# Patient Record
Sex: MALE | Race: BLACK OR AFRICAN AMERICAN | NOT HISPANIC OR LATINO | Employment: UNEMPLOYED | URBAN - METROPOLITAN AREA
[De-identification: names, ages, dates, MRNs, and addresses within clinical notes are randomized per-mention and may not be internally consistent; named-entity substitution may affect disease eponyms.]

---

## 2017-01-16 DIAGNOSIS — N52.9 IMPOTENCE OF ORGANIC ORIGIN: Primary | ICD-10-CM

## 2017-01-16 RX ORDER — TADALAFIL 20 MG/1
10-20 TABLET ORAL DAILY PRN
Qty: 12 TABLET | Refills: 0 | Status: SHIPPED
Start: 2017-01-16 | End: 2020-06-30

## 2017-01-16 NOTE — TELEPHONE ENCOUNTER
tadalafil (CIALIS) 20 MG tablet      Last Written Prescription Date: 10-29-15  Last Fill Quantity: 12,  # refills: 11   Last Office Visit with [facility], [facility] or [facility] prescribing provider: 11-16-15

## 2017-01-16 NOTE — TELEPHONE ENCOUNTER
Routing refill request to provider for review/approval because:  Patient needs to be seen because it has been more than 1 year since last office visit.  Lay Tomlin RN [facility] Triage.

## 2019-03-16 ENCOUNTER — APPOINTMENT (OUTPATIENT)
Dept: GENERAL RADIOLOGY | Facility: CLINIC | Age: 49
End: 2019-03-16
Attending: PHYSICIAN ASSISTANT

## 2019-03-16 ENCOUNTER — HOSPITAL ENCOUNTER (EMERGENCY)
Facility: CLINIC | Age: 49
Discharge: HOME | End: 2019-03-16
Admitting: PHYSICIAN ASSISTANT

## 2019-03-16 VITALS
OXYGEN SATURATION: 96 % | DIASTOLIC BLOOD PRESSURE: 91 MMHG | SYSTOLIC BLOOD PRESSURE: 134 MMHG | HEIGHT: 71 IN | BODY MASS INDEX: 29.4 KG/M2 | TEMPERATURE: 100 F | HEART RATE: 99 BPM | WEIGHT: 210 LBS | RESPIRATION RATE: 16 BRPM

## 2019-03-16 DIAGNOSIS — M54.50 BILATERAL LOW BACK PAIN WITHOUT SCIATICA: ICD-10-CM
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
## 2019-03-16 PROCEDURE — 99285 EMERGENCY DEPT VISIT HI MDM: CPT

## 2019-03-16 PROCEDURE — 73090 X-RAY EXAM OF FOREARM: CPT | Mod: LT

## 2019-03-16 PROCEDURE — 73110 X-RAY EXAM OF WRIST: CPT | Mod: LT

## 2019-03-16 PROCEDURE — 25000132 ZZH RX MED GY IP 250 OP 250 PS 637: Performed by: PHYSICIAN ASSISTANT

## 2019-03-16 PROCEDURE — 72100 X-RAY EXAM L-S SPINE 2/3 VWS: CPT

## 2019-03-16 RX ORDER — IBUPROFEN 600 MG/1
600 TABLET, FILM COATED ORAL ONCE
Status: COMPLETED | OUTPATIENT
Start: 2019-03-16 | End: 2019-03-16

## 2019-03-16 RX ORDER — METHOCARBAMOL 750 MG/1
750 TABLET, FILM COATED ORAL ONCE
Status: COMPLETED | OUTPATIENT
Start: 2019-03-16 | End: 2019-03-16

## 2019-03-16 RX ORDER — ACETAMINOPHEN 500 MG
1000 TABLET ORAL ONCE
Status: COMPLETED | OUTPATIENT
Start: 2019-03-16 | End: 2019-03-16

## 2019-03-16 RX ORDER — METHOCARBAMOL 750 MG/1
750-1500 TABLET, FILM COATED ORAL 3 TIMES DAILY PRN
Qty: 30 TABLET | Refills: 0 | Status: SHIPPED | OUTPATIENT
Start: 2019-03-16 | End: 2019-03-21

## 2019-03-16 RX ADMIN — ACETAMINOPHEN 1000 MG: 500 TABLET, FILM COATED ORAL at 20:43

## 2019-03-16 RX ADMIN — IBUPROFEN 600 MG: 600 TABLET ORAL at 20:43

## 2019-03-16 RX ADMIN — METHOCARBAMOL TABLETS 750 MG: 750 TABLET, COATED ORAL at 20:43

## 2019-03-16 ASSESSMENT — ENCOUNTER SYMPTOMS
NECK PAIN: 0
HEADACHES: 1
BACK PAIN: 1
ARTHRALGIAS: 1
WEAKNESS: 1

## 2019-03-16 ASSESSMENT — MIFFLIN-ST. JEOR: SCORE: 1834.68

## 2019-03-16 NOTE — ED AVS SNAPSHOT
Emergency Department  [address] [place] [address]  Phone:  [phone]  Fax:  [phone]                                    Abdulaziz Reinoso   MRN: [id number]    Department:   Emergency Department   Date of Visit:  3/16/2019           After Visit Summary Signature Page    I have received my discharge instructions, and my questions have been answered. I have discussed any challenges I see with this plan with the nurse or doctor.    ..........................................................................................................................................  Patient/Patient Representative Signature      ..........................................................................................................................................  Patient Representative Print Name and Relationship to Patient    ..................................................               ................................................  Date                                   Time    ..........................................................................................................................................  Reviewed by Signature/Title    ...................................................              ..............................................  Date                                               Time          22EPIC Rev 08/18

## 2019-03-17 NOTE — ED PROVIDER NOTES
History     Chief Complaint:  Fall      HPI   Abdulaziz Reinoso is a 48 year old male with a history of schizoaffective disorder, bipolar II disorder, substance abuse, and chronic bilateral low back pain who presents to the ED for evaluation after a mechanical fall. The patient reports that he slipped and fell on the ice today, falling sideways and landing on his outstretched left hand and back. He states that he did hit the back of his head on the ice, although he did not lose consciousness or vomit and is not on blood thinners. The patient is experiencing some mid lower back pain and left wrist pain secondary to the fall, and thus he presented to the ED for evaluation. Here, the patient additionally endorses some left forearm pain and left wrist pain. He also reports a mild headache, although he has no neck pain or visual disturbance. He denies any other injuries from the fall. Of note, the patient endorses having low back pain at baseline, for which he takes 800 mg of gabapentin three times per day. He notes that his PCP recently left town and he ran out of his prescription, so he has not been taking this for the past week.     Allergies:  NKDA     Medications:    Omeprazole  Pravastatin  Seroquel  Sertraline  Viagra  Tadalafil  Trazodone  Gabapentin    Past Medical History:    ADHD  Bipolar II disorder  GERD  Schizoaffective disorder  Chronic low back pain  Insomnia  Cocaine dependence    Past Surgical History:    The patient does not have any pertinent past surgical history.    Family History:    Diabetes  Hypertension  Pancreatic cancer  Cerebrovascular disease  Skin cancer    Social History:  Smoking Status: Current every day smoker  Negative for alcohol use.  History of cocaine dependence.  Marital Status:  Single    Review of Systems   Eyes: Negative for visual disturbance.   Musculoskeletal: Positive for arthralgias (L wrist) and back pain. Negative for neck pain.   Neurological: Positive for weakness (L  "hand) and headaches.   All other systems reviewed and are negative.      Physical Exam     Patient Vitals for the past 24 hrs:   BP Temp Temp src Pulse Resp SpO2 Height Weight   03/16/19 2004 (!) 134/91 100  F (37.8  C) Oral 99 16 96 % -- --   03/16/19 1947 -- -- -- -- -- -- 1.803 m (5' 11\") 95.3 kg (210 lb)        Physical Exam  General: Alert and cooperative with exam. Resting comfortably on gurney  Head:  Scalp is NC/AT  Eyes:  No scleral icterus, PERRL  ENT:  The external nose and ears are normal.   Neck:  Normal range of motion without rigidity.  CV:  Regular rate and rhythm    No pathologic murmur, rubs, or gallops.    2+ radial pulses bilaterally with normal cap refill.  Resp:  Breath sounds are clear bilaterally.  No crackles, wheezes, rhonchi.    Non-labored, no retractions or accessory muscle use  GI:  Abdomen is soft, no distension, no tenderness, no masses. No peritoneal signs.  Bowel sounds present in all quadrants  :  No suprapubic or flank tenderness  MS:  No lower extremity edema or asymmetric calf swelling.    No midline cervical, thoracic, or lumbar tenderness    Diffuse tenderness to palpation over the distal forearm and wrist. No tenderness over the elbow with normal ROM in the wrist and elbow. No anatomic snuffbox tenderness or pain with axial loading of the thumb.   Skin:  Warm and dry, No rash or lesions noted.  Neuro: Oriented. No gross motor deficits.    Strength and sensation grossly intact in all 4 extremities and in radial, ulnar, median nerve distributions of hand.  Cranial nerves 2-12 intact. GCS: 15. Normal finger to nose and heel to shin testing.  Gait normal  Psych: Awake. Alert. Normal affect. Appropriate interactions.     Emergency Department Course     Imaging:  Radiographic findings were communicated with the patient who voiced understanding of the findings.    XR Wrist 3 views, Left:   1. Chronic scaphoid waist fracture with nonunion but without proximal  pole avascular " necrosis.  2. Small well-corticated ossification along the dorsal aspect of wrist  likely represents a triquetral fracture fragment and appears chronic.  3. Soft tissue swelling along the dorsal aspect of wrist is noted.  4. No acute fracture or malalignment of the wrist or forearm is seen.  5. Olecranon spur at the triceps tendon insertion is noted and has  lucency at the base. This could represent a fracture of the spur. This  is of indeterminate age, as per radiology.      XR Left Radius/Ulna PA & LAT:   1. Chronic scaphoid waist fracture with nonunion but without proximal  pole avascular necrosis.  2. Small well-corticated ossification along the dorsal aspect of wrist  likely represents a triquetral fracture fragment and appears chronic.  3. Soft tissue swelling along the dorsal aspect of wrist is noted.  4. No acute fracture or malalignment of the wrist or forearm is seen.  5. Olecranon spur at the triceps tendon insertion is noted and has  lucency at the base. This could represent a fracture of the spur. This  is of indeterminate age, as per radiology.     XR Lumbar spine, 2-3 Views:   There is some upper lumbar kyphosis. There is also some  minimal retrolisthesis at L2-L3. Posterior alignment is otherwise  normal. Minimal degenerative changes are noted. No evidence for  fracture, as per radiology.      Interventions:  2043 Tylenol 1000 mg PO   Ibuprofen 600 mg PO   Robaxin 750 mg PO    Emergency Department Course:  Nursing notes and vitals reviewed. (2009) I performed an exam of the patient as documented above.     Medicine administered as documented above.     The patient was sent for left wrist, left radius/ulna, and lumbar spine x-ray's while in the emergency department, findings above.     (2153) I rechecked the patient and discussed the results of his workup thus far.     Findings and plan explained to the Patient. Patient discharged home with instructions regarding supportive care, medications, and  "reasons to return. The importance of close follow-up was reviewed. The patient was prescribed Robaxin.    I personally reviewed the imaging results with the Patient and answered all related questions prior to discharge.      Impression & Plan      Medical Decision Making:  Abdulaziz Reinoso is a 48 year old male who presents for evaluation of closed head injury. By [place] CT criteria, the patient falls into a very low risk category for skull fracture or intracranial injury (GCS 15, no suspected open or depressed skull fracture, no vomiting, Age <65, non-severe injury mechanism, no signs of basilar skull fracture, no retrograde amnesia, no blood thinner use, no seizure). I have discussed the risk/benefit analysis of CT imaging in light of the above with patient, and we have decided together against CT imaging. No indication for imaging of C-spine by NEXUS criteria (No midline tenderness, painful distracting injury, intoxication, focal neuro deficit, encephalopathy), and able to actively rotate neck without pain.    He has no midline spinal tenderness and x-rays of the back are negative for acute fracture.  He is able to ambulate without difficulty and reports that his back pain is more similar to his chronic back pain though he has been off his gabapentin.  I think the likelihood of fracture is very unlikely and would not pursue further imaging such as CT scan.  X-rays of the wrist and forearm show old fractures of the scaphoid and triquetral bones, as well as a possible olecranon spur versus fracture of indeterminate age.  The patient has no tenderness to palpation over the elbow or olecranon process and normal range of motion therefore I do not feel this likely represents an acute fracture. Neurovascular status is intact distally.  He has no anatomic snuffbox tenderness or pain with axial loading of the thumb making acute scaphoid fracture very unlikely.      Patient understands that male must return if any \"red " "flag\" symptoms develop after discharge--including severe headache, vomiting, abnormal behavior, seizures, or any other concerns--as this could indicate intracranial injury and require a CT scan. This information is also provided in writing at discharge.  I have discussed second impact syndrome, the importance of not sustaining repeated concussion while still symptomatic, and appropriate precautions.  Discussed conservative treatment with Tylenol, ibuprofen, and given prescription for muscle relaxer as below.  The patient was discharged home with instruction to follow-up with primary care provider in 2-3 days for recheck.     Diagnosis:    ICD-10-CM    1. Fall, initial encounter W19.XXXA    2. Bilateral low back pain without sciatica M54.5        Disposition:  discharged to home    Discharge Medications:     Medication List      Started    methocarbamol 750 MG tablet  Commonly known as:  ROBAXIN  750-1,500 mg, Oral, 3 TIMES DAILY PRN            Scribe Disclosure:  I, Judy Dawson, am serving as a scribe on 3/16/2019 at 8:09 PM to personally document services performed by PAULA Bansal based on my observations and the provider's statements to me.      Judy Dawson  3/16/2019    EMERGENCY DEPARTMENT       Dashawn Ortiz PA-C  03/16/19 2235    "

## 2020-06-10 ENCOUNTER — COMMUNICATION - HEALTHEAST (OUTPATIENT)
Dept: TELEHEALTH | Facility: CLINIC | Age: 50
End: 2020-06-10

## 2020-06-10 ENCOUNTER — OFFICE VISIT - HEALTHEAST (OUTPATIENT)
Dept: FAMILY MEDICINE | Facility: CLINIC | Age: 50
End: 2020-06-10

## 2020-06-10 DIAGNOSIS — M25.562 ACUTE PAIN OF LEFT KNEE: ICD-10-CM

## 2020-06-25 ENCOUNTER — TRANSFERRED RECORDS (OUTPATIENT)
Dept: HEALTH INFORMATION MANAGEMENT | Facility: CLINIC | Age: 50
End: 2020-06-25

## 2020-06-26 ENCOUNTER — TELEPHONE (OUTPATIENT)
Dept: OPHTHALMOLOGY | Facility: CLINIC | Age: 50
End: 2020-06-26

## 2020-06-26 NOTE — TELEPHONE ENCOUNTER
Reviewed with Dr. Beavers  Able to see Monday    Spoke to pt at 1600    Pt confirmed Monday at 0800 with Dr. Beavers at [facility]    Wade Donnelly RN 4:11 PM 06/26/20    --    [facility] referring -- Dr. Payton     Left eye macular off retina detachment     Fall may 3rd    3 weeks later-- veil over left eye    Lost more vision last week    No previous retina detachment/DM    No previous cataract exam    Vision yesterday 20/200 left eye    Notes to be sent    Same name with middle initial and last 4 of social    Updated phone number and address    Left message with direct number at [phone]    Wade Donnelly RN 1:59 PM 06/26/20

## 2020-06-26 NOTE — TELEPHONE ENCOUNTER
6/26/2020 3:28PM Unable to reach Wade in Triage, so called [facility]  who verified with Wade that the correct number for the patient to call is [phone].    6/26/2020 3:17PM Patient states he needs to schedule with Dr. Beavers because he needs surgery. Believes Cayla, possibly a nurse in the referring provider's office, gave him my number to call. He cannot remember the name of the referring doctor or the referring clinic. Gave him [phone]. He told me he has that number and called that number but was told to call me. Transferred him to [phone].

## 2020-06-30 ENCOUNTER — OFFICE VISIT (OUTPATIENT)
Dept: OPHTHALMOLOGY | Facility: CLINIC | Age: 50
End: 2020-06-30
Attending: OPHTHALMOLOGY
Payer: COMMERCIAL

## 2020-06-30 DIAGNOSIS — H33.22 RETINAL DETACHMENT, LEFT: Primary | ICD-10-CM

## 2020-06-30 PROCEDURE — 92134 CPTRZ OPH DX IMG PST SGM RTA: CPT | Mod: ZF | Performed by: OPHTHALMOLOGY

## 2020-06-30 PROCEDURE — G0463 HOSPITAL OUTPT CLINIC VISIT: HCPCS | Mod: ZF

## 2020-06-30 PROCEDURE — 92250 FUNDUS PHOTOGRAPHY W/I&R: CPT | Mod: ZF | Performed by: OPHTHALMOLOGY

## 2020-06-30 RX ORDER — PREDNISOLONE ACETATE 10 MG/ML
1 SUSPENSION/ DROPS OPHTHALMIC 4 TIMES DAILY
Qty: 1 BOTTLE | Refills: 0 | Status: SHIPPED | OUTPATIENT
Start: 2020-06-30 | End: 2020-06-30

## 2020-06-30 RX ORDER — PREDNISOLONE ACETATE 10 MG/ML
1 SUSPENSION/ DROPS OPHTHALMIC 4 TIMES DAILY
Qty: 1 BOTTLE | Refills: 0 | Status: SHIPPED | OUTPATIENT
Start: 2020-06-30 | End: 2020-07-09

## 2020-06-30 ASSESSMENT — VISUAL ACUITY
OS_CC: 3' 200 E
OD_CC: 20/25
METHOD: SNELLEN - LINEAR

## 2020-06-30 ASSESSMENT — TONOMETRY
OD_IOP_MMHG: 11
OS_IOP_MMHG: 5
IOP_METHOD: TONOPEN

## 2020-06-30 ASSESSMENT — REFRACTION_WEARINGRX
OD_CYLINDER: +1.50
OS_SPHERE: -6.50
OS_AXIS: 081
OD_AXIS: 093
OD_SPHERE: -4.50
OS_CYLINDER: +1.75

## 2020-06-30 ASSESSMENT — CONF VISUAL FIELD
OD_NORMAL: 1
OS_SUPERIOR_NASAL_RESTRICTION: 3
METHOD: COUNTING FINGERS
OS_SUPERIOR_TEMPORAL_RESTRICTION: 3
OS_INFERIOR_TEMPORAL_RESTRICTION: 3
OS_INFERIOR_NASAL_RESTRICTION: 3

## 2020-06-30 ASSESSMENT — CUP TO DISC RATIO
OD_RATIO: 0.2
OS_RATIO: 0.2

## 2020-06-30 ASSESSMENT — EXTERNAL EXAM - RIGHT EYE: OD_EXAM: NORMAL

## 2020-06-30 ASSESSMENT — SLIT LAMP EXAM - LIDS
COMMENTS: NORMAL
COMMENTS: NORMAL

## 2020-06-30 NOTE — NURSING NOTE
Chief Complaints and History of Present Illnesses   Patient presents with     Consult     Chief Complaint(s) and History of Present Illness(es)     Consult               Comments     Abdulaziz is here referred by a doctor who's name he has forgotten for an evaluation for retinal detachment LE. He says he fell on May 3rd, and his eyes seemed fine until about two weeks ago. He started to notice floaters around that time. He says he also feels pain LE that has been present for about a month all the time. He says LE vision seems like there is a cover over it all thee time. RE seems fine.      Phillip Whitfield COT 9:41 AM June 30, 2020
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
## 2020-07-01 ENCOUNTER — TELEPHONE (OUTPATIENT)
Dept: OPHTHALMOLOGY | Facility: CLINIC | Age: 50
End: 2020-07-01

## 2020-07-01 ENCOUNTER — HOSPITAL ENCOUNTER (OUTPATIENT)
Facility: CLINIC | Age: 50
Discharge: HOME | End: 2020-07-01
Attending: OPHTHALMOLOGY | Admitting: OPHTHALMOLOGY
Payer: COMMERCIAL

## 2020-07-01 DIAGNOSIS — Z11.59 ENCOUNTER FOR SCREENING FOR OTHER VIRAL DISEASES: Primary | ICD-10-CM

## 2020-07-01 DIAGNOSIS — H33.22 RETINAL DETACHMENT, LEFT: ICD-10-CM

## 2020-07-01 NOTE — TELEPHONE ENCOUNTER
I called patient to schedule surgery with Dr. Neelam Beavers, I left a voicemail with callback # [phone]

## 2020-07-03 ENCOUNTER — TELEPHONE (OUTPATIENT)
Dept: OPHTHALMOLOGY | Facility: CLINIC | Age: 50
End: 2020-07-03

## 2020-07-03 NOTE — TELEPHONE ENCOUNTER
Patient is scheduled for surgery with Dr. Neelam Beavers     Spoke with: Abdulaziz     Date of Surgery: 07/06/20     Location:   [facility]:  [address] [place] [id number]     Informed patient they will need an adult : Yes     H&P will be completed at: 6/10 [facility]     Post Op scheduled on 07/07 and 07/14     Surgery packet was mailed 07/02/20     Additional comments: Advised RN will call 1 - 2 business days prior with arrival time and instructions.

## 2020-07-06 ENCOUNTER — TELEPHONE (OUTPATIENT)
Dept: OPHTHALMOLOGY | Facility: CLINIC | Age: 50
End: 2020-07-06

## 2020-07-06 ENCOUNTER — PREP FOR PROCEDURE (OUTPATIENT)
Dept: OPHTHALMOLOGY | Facility: CLINIC | Age: 50
End: 2020-07-06

## 2020-07-06 ENCOUNTER — ANESTHESIA EVENT (OUTPATIENT)
Dept: SURGERY | Facility: CLINIC | Age: 50
End: 2020-07-06

## 2020-07-06 ENCOUNTER — APPOINTMENT (OUTPATIENT)
Dept: LAB | Facility: CLINIC | Age: 50
End: 2020-07-06
Attending: OPHTHALMOLOGY
Payer: COMMERCIAL

## 2020-07-06 ENCOUNTER — ANESTHESIA (OUTPATIENT)
Dept: SURGERY | Facility: CLINIC | Age: 50
End: 2020-07-06

## 2020-07-06 VITALS
BODY MASS INDEX: 25.99 KG/M2 | WEIGHT: 185.63 LBS | TEMPERATURE: 98.6 F | HEIGHT: 71 IN | DIASTOLIC BLOOD PRESSURE: 107 MMHG | OXYGEN SATURATION: 97 % | SYSTOLIC BLOOD PRESSURE: 142 MMHG | HEART RATE: 79 BPM | RESPIRATION RATE: 20 BRPM

## 2020-07-06 DIAGNOSIS — H33.22 RETINAL DETACHMENT, LEFT: Primary | ICD-10-CM

## 2020-07-06 LAB
SARS-COV-2 PCR COMMENT: NORMAL
SARS-COV-2 RNA SPEC QL NAA+PROBE: NEGATIVE
SARS-COV-2 RNA SPEC QL NAA+PROBE: NORMAL
SPECIMEN SOURCE: NORMAL
SPECIMEN SOURCE: NORMAL

## 2020-07-06 PROCEDURE — 82962 GLUCOSE BLOOD TEST: CPT

## 2020-07-06 PROCEDURE — 40000879 ZZH CANCELLED SURGERY UP TO 16-30 MINS: Performed by: OPHTHALMOLOGY

## 2020-07-06 PROCEDURE — U0003 INFECTIOUS AGENT DETECTION BY NUCLEIC ACID (DNA OR RNA); SEVERE ACUTE RESPIRATORY SYNDROME CORONAVIRUS 2 (SARS-COV-2) (CORONAVIRUS DISEASE [COVID-19]), AMPLIFIED PROBE TECHNIQUE, MAKING USE OF HIGH THROUGHPUT TECHNOLOGIES AS DESCRIBED BY CMS-2020-01-R: HCPCS | Performed by: ANESTHESIOLOGY

## 2020-07-06 RX ORDER — CYCLOPENTOLAT/TROPIC/PHENYLEPH 1%-1%-2.5%
1 DROPS (EA) OPHTHALMIC (EYE)
Status: DISCONTINUED | OUTPATIENT
Start: 2020-07-06 | End: 2021-07-07 | Stop reason: HOSPADM

## 2020-07-06 ASSESSMENT — MIFFLIN-ST. JEOR: SCORE: 1714.13

## 2020-07-06 NOTE — TELEPHONE ENCOUNTER
Abdulaziz called to find out the location of surgery, address, when he should arrive, what he can eat and drink and other information for surgery today. I provided the time of arrival and surgery time as well as the address to [facility]. I also provided the pre-admission nursing line [phone] so they can call for further instructions.

## 2020-07-06 NOTE — OR NURSING
Covid test done on arrival as patient did not have done. Case canceled due to this and will be done on Thursday. Patient informed and not happy with decision.Told him someone will call him with time and NPO guidelines.

## 2020-07-06 NOTE — TELEPHONE ENCOUNTER
I called patient to schedule surgery with Dr. Neelam Beavers, I left a voicemail with callback # [phone]     I let Abdulaziz know I'm working on getting him on the schedule for Thursday morning and he can call me back to go over details.

## 2020-07-06 NOTE — ANESTHESIA PREPROCEDURE EVALUATION
"Anesthesia Pre-Procedure Evaluation    Patient: Abdulaziz Reinoso   MRN:     [id number] Gender:   male   Age:    50 year old :      1970        Preoperative Diagnosis: Retinal detachment, left [H33.22]   Procedure(s):  right eye: vitrectomy, gas bubble, endolaser, membrane strippig     LABS:  CBC:   Lab Results   Component Value Date    WBC 4.9 10/28/2015    HGB 16.0 10/28/2015    HCT 48.3 10/28/2015     10/28/2015     BMP:   Lab Results   Component Value Date     10/28/2015    POTASSIUM 4.6 10/28/2015    CHLORIDE 106 10/28/2015    CO2 27 10/28/2015    BUN 12 10/28/2015    CR 1.04 10/28/2015    GLC 76 10/28/2015     COAGS: No results found for: PTT, INR, FIBR  POC: No results found for: BGM, HCG, HCGS  OTHER:   Lab Results   Component Value Date    RONALDO 9.0 10/28/2015    ALT 31 10/28/2015    TSH 1.46 10/28/2015        Preop Vitals    BP Readings from Last 3 Encounters:   19 (!) 134/91   11/16/15 125/81   10/28/15 119/87    Pulse Readings from Last 3 Encounters:   19 99   11/16/15 106   10/28/15 109      Resp Readings from Last 3 Encounters:   19 16    SpO2 Readings from Last 3 Encounters:   19 96%   11/16/15 98%   10/28/15 99%      Temp Readings from Last 1 Encounters:   19 37.8  C (100  F) (Oral)    Ht Readings from Last 1 Encounters:   19 1.803 m (5' 11\")      Wt Readings from Last 1 Encounters:   19 95.3 kg (210 lb)    Estimated body mass index is 29.29 kg/m  as calculated from the following:    Height as of 3/16/19: 1.803 m (5' 11\").    Weight as of 3/16/19: 95.3 kg (210 lb).     LDA:        Past Medical History:   Diagnosis Date     ADHD      Schizoaffective disorder (H)       No past surgical history on file.   No Known Allergies     Anesthesia Evaluation    ROS/Med Hx   Comments: Abdulaziz is scheduled for right eye vitrectomy with use of endolaser and gas bubble with membrane stripping      Neuro Findings   Comments: History of Schizoaffective disorder, " ADHD and cocaine abuse            GI/Hepatic/Renal Findings   (+) GERD    Endocrine/Metabolic Findings   Hypothyroidism:             Additional Notes  Allergies:  No Known Allergies     Current Outpatient Medications:  prednisoLONE acetate (PRED FORTE) 1 % ophthalmic suspension    No medications prior to admission.        CON CHISHOLM AN PHYSICAL EXAM    CON CHISHOLM AN PLAN NO PONV RULE    Rah Maynard MD

## 2020-07-07 LAB — GLUCOSE BLDC GLUCOMTR-MCNC: 89 MG/DL (ref 70–99)

## 2020-07-08 ENCOUNTER — ANESTHESIA EVENT (OUTPATIENT)
Dept: SURGERY | Facility: AMBULATORY SURGERY CENTER | Age: 50
End: 2020-07-08

## 2020-07-08 ENCOUNTER — TELEPHONE (OUTPATIENT)
Dept: OPHTHALMOLOGY | Facility: CLINIC | Age: 50
End: 2020-07-08

## 2020-07-08 NOTE — TELEPHONE ENCOUNTER
Returned Abdulaziz's call, he was not available and they asked that I call back in a half hour. I have set an alarm and will reach out again at 11:45AM.

## 2020-07-09 ENCOUNTER — ANESTHESIA (OUTPATIENT)
Dept: SURGERY | Facility: AMBULATORY SURGERY CENTER | Age: 50
End: 2020-07-09

## 2020-07-09 ENCOUNTER — HOSPITAL ENCOUNTER (OUTPATIENT)
Facility: AMBULATORY SURGERY CENTER | Age: 50
End: 2020-07-09
Attending: OPHTHALMOLOGY
Payer: COMMERCIAL

## 2020-07-09 VITALS
RESPIRATION RATE: 13 BRPM | DIASTOLIC BLOOD PRESSURE: 76 MMHG | TEMPERATURE: 97 F | HEART RATE: 63 BPM | OXYGEN SATURATION: 96 % | SYSTOLIC BLOOD PRESSURE: 128 MMHG

## 2020-07-09 DIAGNOSIS — Z48.810 AFTERCARE FOLLOWING SURGERY OF A SENSE ORGAN: ICD-10-CM

## 2020-07-09 DIAGNOSIS — H33.22 RETINAL DETACHMENT, LEFT: Primary | ICD-10-CM

## 2020-07-09 RX ORDER — EPHEDRINE SULFATE 50 MG/ML
INJECTION, SOLUTION INTRAMUSCULAR; INTRAVENOUS; SUBCUTANEOUS PRN
Status: DISCONTINUED | OUTPATIENT
Start: 2020-07-09 | End: 2020-07-09

## 2020-07-09 RX ORDER — ONDANSETRON 2 MG/ML
4 INJECTION INTRAMUSCULAR; INTRAVENOUS EVERY 30 MIN PRN
Status: DISCONTINUED | OUTPATIENT
Start: 2020-07-09 | End: 2020-07-10 | Stop reason: HOSPADM

## 2020-07-09 RX ORDER — HYDROCODONE BITARTRATE AND ACETAMINOPHEN 5; 325 MG/1; MG/1
1 TABLET ORAL EVERY 6 HOURS PRN
Qty: 5 TABLET | Refills: 0 | Status: SHIPPED | OUTPATIENT
Start: 2020-07-09 | End: 2020-07-12

## 2020-07-09 RX ORDER — ERYTHROMYCIN 5 MG/G
OINTMENT OPHTHALMIC PRN
Status: DISCONTINUED | OUTPATIENT
Start: 2020-07-09 | End: 2020-07-09 | Stop reason: HOSPADM

## 2020-07-09 RX ORDER — ONDANSETRON 4 MG/1
4 TABLET, ORALLY DISINTEGRATING ORAL EVERY 30 MIN PRN
Status: DISCONTINUED | OUTPATIENT
Start: 2020-07-09 | End: 2020-07-10 | Stop reason: HOSPADM

## 2020-07-09 RX ORDER — LIDOCAINE HYDROCHLORIDE 20 MG/ML
INJECTION, SOLUTION INFILTRATION; PERINEURAL PRN
Status: DISCONTINUED | OUTPATIENT
Start: 2020-07-09 | End: 2020-07-09

## 2020-07-09 RX ORDER — OXYCODONE HYDROCHLORIDE 5 MG/1
5 TABLET ORAL EVERY 4 HOURS PRN
Status: DISCONTINUED | OUTPATIENT
Start: 2020-07-09 | End: 2020-07-10 | Stop reason: HOSPADM

## 2020-07-09 RX ORDER — FENTANYL CITRATE 50 UG/ML
25-50 INJECTION, SOLUTION INTRAMUSCULAR; INTRAVENOUS
Status: DISCONTINUED | OUTPATIENT
Start: 2020-07-09 | End: 2020-07-10 | Stop reason: HOSPADM

## 2020-07-09 RX ORDER — ACETAMINOPHEN 325 MG/1
975 TABLET ORAL ONCE
Status: COMPLETED | OUTPATIENT
Start: 2020-07-09 | End: 2020-07-09

## 2020-07-09 RX ORDER — GABAPENTIN 300 MG/1
300 CAPSULE ORAL ONCE
Status: COMPLETED | OUTPATIENT
Start: 2020-07-09 | End: 2020-07-09

## 2020-07-09 RX ORDER — BALANCED SALT SOLUTION 6.4; .75; .48; .3; 3.9; 1.7 MG/ML; MG/ML; MG/ML; MG/ML; MG/ML; MG/ML
SOLUTION OPHTHALMIC PRN
Status: DISCONTINUED | OUTPATIENT
Start: 2020-07-09 | End: 2020-07-09 | Stop reason: HOSPADM

## 2020-07-09 RX ORDER — PREDNISOLONE ACETATE 10 MG/ML
1 SUSPENSION/ DROPS OPHTHALMIC 4 TIMES DAILY
Qty: 5 ML | Refills: 0 | Status: SHIPPED | OUTPATIENT
Start: 2020-07-09

## 2020-07-09 RX ORDER — NALOXONE HYDROCHLORIDE 0.4 MG/ML
.1-.4 INJECTION, SOLUTION INTRAMUSCULAR; INTRAVENOUS; SUBCUTANEOUS
Status: DISCONTINUED | OUTPATIENT
Start: 2020-07-09 | End: 2020-07-10 | Stop reason: HOSPADM

## 2020-07-09 RX ORDER — ONDANSETRON 2 MG/ML
INJECTION INTRAMUSCULAR; INTRAVENOUS PRN
Status: DISCONTINUED | OUTPATIENT
Start: 2020-07-09 | End: 2020-07-09

## 2020-07-09 RX ORDER — HYDROMORPHONE HYDROCHLORIDE 1 MG/ML
.3-.5 INJECTION, SOLUTION INTRAMUSCULAR; INTRAVENOUS; SUBCUTANEOUS EVERY 10 MIN PRN
Status: DISCONTINUED | OUTPATIENT
Start: 2020-07-09 | End: 2020-07-10 | Stop reason: HOSPADM

## 2020-07-09 RX ORDER — ATROPINE SULFATE 10 MG/ML
SOLUTION/ DROPS OPHTHALMIC PRN
Status: DISCONTINUED | OUTPATIENT
Start: 2020-07-09 | End: 2020-07-09 | Stop reason: HOSPADM

## 2020-07-09 RX ORDER — FENTANYL CITRATE 50 UG/ML
INJECTION, SOLUTION INTRAMUSCULAR; INTRAVENOUS PRN
Status: DISCONTINUED | OUTPATIENT
Start: 2020-07-09 | End: 2020-07-09

## 2020-07-09 RX ORDER — LIDOCAINE 40 MG/G
CREAM TOPICAL
Status: DISCONTINUED | OUTPATIENT
Start: 2020-07-09 | End: 2020-07-09 | Stop reason: HOSPADM

## 2020-07-09 RX ORDER — DEXAMETHASONE SODIUM PHOSPHATE 4 MG/ML
INJECTION, SOLUTION INTRA-ARTICULAR; INTRALESIONAL; INTRAMUSCULAR; INTRAVENOUS; SOFT TISSUE PRN
Status: DISCONTINUED | OUTPATIENT
Start: 2020-07-09 | End: 2020-07-09

## 2020-07-09 RX ORDER — TIMOLOL MALEATE 5 MG/ML
SOLUTION/ DROPS OPHTHALMIC PRN
Status: DISCONTINUED | OUTPATIENT
Start: 2020-07-09 | End: 2020-07-09 | Stop reason: HOSPADM

## 2020-07-09 RX ORDER — PROPOFOL 10 MG/ML
INJECTION, EMULSION INTRAVENOUS CONTINUOUS PRN
Status: DISCONTINUED | OUTPATIENT
Start: 2020-07-09 | End: 2020-07-09

## 2020-07-09 RX ORDER — CYCLOPENTOLAT/TROPIC/PHENYLEPH 1%-1%-2.5%
1 DROPS (EA) OPHTHALMIC (EYE)
Status: COMPLETED | OUTPATIENT
Start: 2020-07-09 | End: 2020-07-09

## 2020-07-09 RX ORDER — FENTANYL CITRATE 50 UG/ML
25-50 INJECTION, SOLUTION INTRAMUSCULAR; INTRAVENOUS
Status: DISCONTINUED | OUTPATIENT
Start: 2020-07-09 | End: 2020-07-09 | Stop reason: HOSPADM

## 2020-07-09 RX ORDER — SODIUM CHLORIDE, SODIUM LACTATE, POTASSIUM CHLORIDE, CALCIUM CHLORIDE 600; 310; 30; 20 MG/100ML; MG/100ML; MG/100ML; MG/100ML
INJECTION, SOLUTION INTRAVENOUS CONTINUOUS
Status: DISCONTINUED | OUTPATIENT
Start: 2020-07-09 | End: 2020-07-10 | Stop reason: HOSPADM

## 2020-07-09 RX ORDER — MEPERIDINE HYDROCHLORIDE 25 MG/ML
12.5 INJECTION INTRAMUSCULAR; INTRAVENOUS; SUBCUTANEOUS
Status: DISCONTINUED | OUTPATIENT
Start: 2020-07-09 | End: 2020-07-10 | Stop reason: HOSPADM

## 2020-07-09 RX ORDER — PROPOFOL 10 MG/ML
INJECTION, EMULSION INTRAVENOUS PRN
Status: DISCONTINUED | OUTPATIENT
Start: 2020-07-09 | End: 2020-07-09

## 2020-07-09 RX ORDER — BRIMONIDINE TARTRATE 2 MG/ML
SOLUTION/ DROPS OPHTHALMIC PRN
Status: DISCONTINUED | OUTPATIENT
Start: 2020-07-09 | End: 2020-07-09 | Stop reason: HOSPADM

## 2020-07-09 RX ORDER — SODIUM CHLORIDE, SODIUM LACTATE, POTASSIUM CHLORIDE, CALCIUM CHLORIDE 600; 310; 30; 20 MG/100ML; MG/100ML; MG/100ML; MG/100ML
INJECTION, SOLUTION INTRAVENOUS CONTINUOUS
Status: DISCONTINUED | OUTPATIENT
Start: 2020-07-09 | End: 2020-07-09 | Stop reason: HOSPADM

## 2020-07-09 RX ORDER — DEXAMETHASONE SODIUM PHOSPHATE 4 MG/ML
INJECTION, SOLUTION INTRA-ARTICULAR; INTRALESIONAL; INTRAMUSCULAR; INTRAVENOUS; SOFT TISSUE PRN
Status: DISCONTINUED | OUTPATIENT
Start: 2020-07-09 | End: 2020-07-09 | Stop reason: HOSPADM

## 2020-07-09 RX ORDER — POLYMYXIN B SULFATE AND TRIMETHOPRIM 1; 10000 MG/ML; [USP'U]/ML
1 SOLUTION OPHTHALMIC 4 TIMES DAILY
Qty: 10 ML | Refills: 0 | Status: SHIPPED | OUTPATIENT
Start: 2020-07-09

## 2020-07-09 RX ADMIN — PROPOFOL: 10 INJECTION, EMULSION INTRAVENOUS at 11:33

## 2020-07-09 RX ADMIN — FENTANYL CITRATE 50 MCG: 50 INJECTION, SOLUTION INTRAMUSCULAR; INTRAVENOUS at 10:52

## 2020-07-09 RX ADMIN — PROPOFOL 150 MG: 10 INJECTION, EMULSION INTRAVENOUS at 10:52

## 2020-07-09 RX ADMIN — Medication 100 MCG: at 11:32

## 2020-07-09 RX ADMIN — Medication 100 MCG: at 11:23

## 2020-07-09 RX ADMIN — Medication 1 DROP: at 10:06

## 2020-07-09 RX ADMIN — OXYCODONE HYDROCHLORIDE 5 MG: 5 TABLET ORAL at 13:59

## 2020-07-09 RX ADMIN — SODIUM CHLORIDE, SODIUM LACTATE, POTASSIUM CHLORIDE, CALCIUM CHLORIDE: 600; 310; 30; 20 INJECTION, SOLUTION INTRAVENOUS at 10:19

## 2020-07-09 RX ADMIN — EPHEDRINE SULFATE 5 MG: 50 INJECTION, SOLUTION INTRAMUSCULAR; INTRAVENOUS; SUBCUTANEOUS at 11:20

## 2020-07-09 RX ADMIN — Medication 1 DROP: at 10:11

## 2020-07-09 RX ADMIN — DEXAMETHASONE SODIUM PHOSPHATE 4 MG: 4 INJECTION, SOLUTION INTRA-ARTICULAR; INTRALESIONAL; INTRAMUSCULAR; INTRAVENOUS; SOFT TISSUE at 11:03

## 2020-07-09 RX ADMIN — Medication 50 MG: at 10:52

## 2020-07-09 RX ADMIN — FENTANYL CITRATE 50 MCG: 50 INJECTION, SOLUTION INTRAMUSCULAR; INTRAVENOUS at 12:09

## 2020-07-09 RX ADMIN — Medication 100 MCG: at 11:29

## 2020-07-09 RX ADMIN — PROPOFOL 50 MG: 10 INJECTION, EMULSION INTRAVENOUS at 11:08

## 2020-07-09 RX ADMIN — GABAPENTIN 300 MG: 300 CAPSULE ORAL at 10:07

## 2020-07-09 RX ADMIN — LIDOCAINE HYDROCHLORIDE 80 MG: 20 INJECTION, SOLUTION INFILTRATION; PERINEURAL at 10:52

## 2020-07-09 RX ADMIN — Medication 100 MCG: at 11:14

## 2020-07-09 RX ADMIN — PROPOFOL 150 MCG/KG/MIN: 10 INJECTION, EMULSION INTRAVENOUS at 10:50

## 2020-07-09 RX ADMIN — Medication 100 MCG: at 11:50

## 2020-07-09 RX ADMIN — Medication 100 MCG: at 11:38

## 2020-07-09 RX ADMIN — Medication 100 MCG: at 11:55

## 2020-07-09 RX ADMIN — EPHEDRINE SULFATE 5 MG: 50 INJECTION, SOLUTION INTRAMUSCULAR; INTRAVENOUS; SUBCUTANEOUS at 11:23

## 2020-07-09 RX ADMIN — Medication 100 MCG: at 11:44

## 2020-07-09 RX ADMIN — PROPOFOL 100 MG: 10 INJECTION, EMULSION INTRAVENOUS at 12:07

## 2020-07-09 RX ADMIN — EPHEDRINE SULFATE 5 MG: 50 INJECTION, SOLUTION INTRAMUSCULAR; INTRAVENOUS; SUBCUTANEOUS at 11:50

## 2020-07-09 RX ADMIN — Medication 1 DROP: at 10:16

## 2020-07-09 RX ADMIN — EPHEDRINE SULFATE 5 MG: 50 INJECTION, SOLUTION INTRAMUSCULAR; INTRAVENOUS; SUBCUTANEOUS at 11:41

## 2020-07-09 RX ADMIN — ACETAMINOPHEN 975 MG: 325 TABLET ORAL at 10:07

## 2020-07-09 RX ADMIN — Medication 100 MCG: at 11:41

## 2020-07-09 RX ADMIN — ONDANSETRON 4 MG: 2 INJECTION INTRAMUSCULAR; INTRAVENOUS at 11:57

## 2020-07-09 RX ADMIN — Medication 100 MCG: at 11:20

## 2020-07-09 RX ADMIN — EPHEDRINE SULFATE 5 MG: 50 INJECTION, SOLUTION INTRAMUSCULAR; INTRAVENOUS; SUBCUTANEOUS at 12:05

## 2020-07-09 ASSESSMENT — LIFESTYLE VARIABLES: TOBACCO_USE: 0

## 2020-07-09 NOTE — ANESTHESIA CARE TRANSFER NOTE
Patient: Abdulaziz Reinoso    Procedure(s):  Left eye: 25 Gauge Parsplana vitrectomy, endolaser, membrane stripping, infusion of 15% C3F8 gas    Diagnosis: Retinal detachment, left [H33.22]  Diagnosis Additional Information: No value filed.    Anesthesia Type:   General     Note:  Airway :Face Mask  Patient transferred to:PACU  Comments: 122/97  97%  96.6-52-13Fepriga Report: Identifed the Patient, Identified the Reponsible Provider, Reviewed the pertinent medical history, Discussed the surgical course, Reviewed Intra-OP anesthesia mangement and issues during anesthesia, Set expectations for post-procedure period and Allowed opportunity for questions and acknowledgement of understanding      Vitals: (Last set prior to Anesthesia Care Transfer)    CRNA VITALS  7/9/2020 1157 - 7/9/2020 1234      7/9/2020             Pulse:  89    SpO2:  97 %    Resp Rate (observed):  (!) 1                Electronically Signed By: ELIE Lockhart CRNA  July 9, 2020  12:34 PM

## 2020-07-09 NOTE — OP NOTE
PRE-OP Dx:    1)Rhegmatogenous retinal detachment left eye   2) FTMH OS    Post-OP Dx: same    Attending:  ISRAEL Beavers MD  Fellow: RICO Mckenna MD    Anesthesia: General  Procedure:    1) Pars plana vitrectomy (PPV) 25g   2) FGx 15% C3F8   3) endolaser   4) Membrane stripping ILM    EBL: scant  Specimens: none  Complications: none    Findings:    1) RD LEFT eye with break superior in lattice   2) FTMH OS      Procedure Description:  Abdulaziz Reinoso is a AGE: 50 year old  year old patient with a history of Rhegmatogenous retinal detachment left eye.  After informed consent was obtained, he   was brought into the OR where general anesthesia was administered.  The eye was then prepped and draped in the usual fashion for ophthalmic surgery.    Attention was then turned to the vitrectomy.  Marks were made on the sclera inferotemporally, superotemporally, and superonasally 3.5 mm posterior to the limbus.  The 25g transscleral cannulas were inserted through the sclera using the trocars.  The infusion cannula was connected to the inferonasal cannula and directly visualized to verify it was in the correct location.  A core vitrectomy was performed and the vitreous was stained with kenalog.  The periphery was trimmed with depression.  Retinal break(s) were found at 12:00.  Traction was removed and all breaks were marked with diathermy.      Brilliant blue was used to stain the ILM.  Forceps were used to peel the ILM using an inverted flap technique leaving some ILM folded over the hole.    The break was used as the drainage retinotomy.  A fluid-air exchange was performed.    Laser was placed around the break.   An air-gas exchange was done with 15% C3F8 and the cannulas were removed.  The sclerotomies were sutured as needed with 6-0 plain suture and were tight.  The pressure was checked and verified to be appropriate.  A drop of atropine,  timolol, and alphagan was placed in the eye with erythromycin ointment.  A pad and cuello  shield were taped over the eye.    The patient left the OR with no complications.      The surgery was assisted by RICO Mckenna MD, because no qualified resident was available to assist on the day of surgery.  Due to the delicate and complex nature of this surgery, a skilled assistant was required with membrane peeling, retinal detachment repair, and laser.  I was present for the entire surgery.      Neelam Beavers MD

## 2020-07-09 NOTE — ANESTHESIA PREPROCEDURE EVALUATION
"Anesthesia Pre-Procedure Evaluation    Patient: Abdulaziz Reinoso   MRN:     [id number] Gender:   male   Age:    50 year old :      1970        Preoperative Diagnosis: Retinal detachment, left [H33.22]   Procedure(s):  Left eye: vitrectomy, gas bubble, endolaser, membrane stripping     LABS:  CBC:   Lab Results   Component Value Date    WBC 4.9 10/28/2015    HGB 16.0 10/28/2015    HCT 48.3 10/28/2015     10/28/2015     BMP:   Lab Results   Component Value Date     10/28/2015    POTASSIUM 4.6 10/28/2015    CHLORIDE 106 10/28/2015    CO2 27 10/28/2015    BUN 12 10/28/2015    CR 1.04 10/28/2015    GLC 76 10/28/2015     COAGS: No results found for: PTT, INR, FIBR  POC:   Lab Results   Component Value Date    BGM 89 2020     OTHER:   Lab Results   Component Value Date    RONALDO 9.0 10/28/2015    ALT 31 10/28/2015    TSH 1.46 10/28/2015        Preop Vitals    BP Readings from Last 3 Encounters:   19 (!) 134/91   11/16/15 125/81   10/28/15 119/87    Pulse Readings from Last 3 Encounters:   19 99   11/16/15 106   10/28/15 109      Resp Readings from Last 3 Encounters:   19 16    SpO2 Readings from Last 3 Encounters:   19 96%   11/16/15 98%   10/28/15 99%      Temp Readings from Last 1 Encounters:   19 37.8  C (100  F) (Oral)    Ht Readings from Last 1 Encounters:   19 1.803 m (5' 11\")      Wt Readings from Last 1 Encounters:   19 95.3 kg (210 lb)    Estimated body mass index is 25.89 kg/m  as calculated from the following:    Height as of 20: 1.803 m (5' 11\").    Weight as of 20: 84.2 kg (185 lb 10 oz).     LDA:  Peripheral IV 20 Right Upper forearm (Active)   Site Assessment WDL 20 1015   Line Status Infusing 20 1015   Phlebitis Scale 0-->no symptoms 20 1015   Infiltration Scale 0 20 1015   Number of days: 0       ETT (Active)   Number of days: 0        Past Medical History:   Diagnosis Date     ADHD      Other chronic pain "      Schizoaffective disorder (H)       History reviewed. No pertinent surgical history.   No Known Allergies     Anesthesia Evaluation     . Pt has had prior anesthetic.     No history of anesthetic complications          ROS/MED HX    ENT/Pulmonary:  - neg pulmonary ROS    (-) tobacco use   Neurologic:  - neg neurologic ROS     Cardiovascular:  - neg cardiovascular ROS       METS/Exercise Tolerance:  >4 METS   Hematologic:  - neg hematologic  ROS       Musculoskeletal:  - neg musculoskeletal ROS       GI/Hepatic:  - neg GI/hepatic ROS       Renal/Genitourinary:  - ROS Renal section negative       Endo:  - neg endo ROS       Psychiatric:     (+) psychiatric history schizophrenia (ADHD)      Infectious Disease:  - neg infectious disease ROS       Malignancy:      - no malignancy   Other:    (+) H/O Chronic Pain,                       PHYSICAL EXAM:   Mental Status/Neuro: A/A/O   Airway: Facies: Feasible  Mallampati: I  Mouth/Opening: Full  TM distance: > 6 cm  Neck ROM: Full   Respiratory: Auscultation: CTAB     Resp. Rate: Normal     Resp. Effort: Normal      CV: Rhythm: Regular  Rate: Age appropriate  Heart: Normal Sounds  Edema: None   Comments:      Dental: Normal Dentition                Assessment:   ASA SCORE: 2    H&P: History and physical reviewed and following examination; no interval change.   Smoking Status:  Non-Smoker/Unknown   NPO Status: NPO Appropriate     Plan:   Anes. Type:  General   Pre-Medication: Acetaminophen; Gabapentin   Induction:  IV (Standard)   Airway: ETT; Oral   Access/Monitoring: PIV   Maintenance: Balanced     Postop Plan:   Postop Pain: Opioids  Postop Sedation/Airway: Not planned  Disposition: Outpatient     PONV Management:   Adult Risk Factors:, Non-Smoker, Postop Opioids   Prevention: Ondansetron, Dexamethasone     CONSENT: Direct conversation   Plan and risks discussed with: Patient   Blood Products: N/a                   Oscar Burris DO

## 2020-07-09 NOTE — ANESTHESIA POSTPROCEDURE EVALUATION
Anesthesia POST Procedure Evaluation    Patient: Abdulaziz Reinoso   MRN:     [id number] Gender:   male   Age:    50 year old :      1970        Preoperative Diagnosis: Retinal detachment, left [H33.22]   Procedure(s):  Left eye: 25 Gauge Parsplana vitrectomy, endolaser, membrane stripping, infusion of 15% C3F8 gas   Postop Comments: No value filed.     Anesthesia Type: General       Disposition: Outpatient   Postop Pain Control: Uneventful            Sign Out: Well controlled pain   PONV: No   Neuro/Psych: Uneventful            Sign Out: Acceptable/Baseline neuro status   Airway/Respiratory: Uneventful            Sign Out: Acceptable/Baseline resp. status   CV/Hemodynamics: Uneventful            Sign Out: Acceptable CV status   Other NRE: NONE   DID A NON-ROUTINE EVENT OCCUR? No         Last Anesthesia Record Vitals:  CRNA VITALS  2020 1157 - 2020 1257      2020             Pulse:  89    SpO2:  97 %    Resp Rate (observed):  (!) 1          Last PACU Vitals:  Vitals Value Taken Time   /93 2020 12:45 PM   Temp 36.1  C (97  F) 2020 12:45 PM   Pulse 63 2020 12:45 PM   Resp 6 2020 12:57 PM   SpO2 96 % 2020 12:57 PM   Temp src     NIBP     Pulse     SpO2     Resp     Temp     Ht Rate     Temp 2     Vitals shown include unvalidated device data.      Electronically Signed By: Oscar Burris DO, 2020, 1:47 PM

## 2020-07-09 NOTE — DISCHARGE INSTRUCTIONS
POST-OPERATIVE INSTRUCTIONS FOLLOWING RETINA SURGERY    Neelam Beavers MD, PhD  Department of Ophthalmology  [facility]  [phone]      ACTIVITY:    No heavy lifting after surgery    Keep the bandage in place until you are seen tomorrow at 9:00 AM at the Eye [facility] in [facility] 9th floor.    Do not get your bandage wet      GAS BUBBLE POSITIONING REQUIREMENTS  Positioning requirements will be discussed with you if you have an oil or gas bubble in your eye:    Position:    Face Down    Maintain this positioning for 3 days.    Sleeping face down can be challenging.  One method is to sleep on your stomach with your head hanging over the edge of the bed with a chair there to rest your head on.  Alternatively, you can sleep with your chest laying on top of a large pile of pillows with your head hanging over.  Alternatively, you can place 2 rows of firm pillows side by side with a gap in between.  Sleep on top of the pillows with your head in the gap.  Alternatively, it is also possible to rent positioning equipment from medical supply stores. This typically costs $150-200 per week. Insurance usually does not cover the cost.  Often, I have found patients prefer the pillows they set up themselves to the rented equipment.      When positioning, it is OK to take brief breaks to eat, stretch, clean up, etc.  Try to position for 90% of the time (5 minute break per hour on average).    Do not lay flat on your back until the bubble is gone.    Do not fly on an airplane or travel to elevations more than 1000 feet above [place] until the bubble is gone.    Keep the green bracelet on your wrist until the bubble is gone.  If it breaks, we can give you a replacement        EYE DROPS  Drops will be given to you after the surgery.  They DO NOT need to be used until after you are seen in clinic.  DO NOT disturb your bandage the first night.      When using more than one drop, separate them by 3 minutes  between drops.  Common times to place drops are breakfast, lunch, dinner, and bedtime.  Do not stop your drops without discussing with our office.  If you run out before your appointment, call and we will send in a refill.      Polytrim (polymyxin B / trimethoprim) --- 4 times per day  Pred Forte (prednisolone acetate) --- 4 times per day      PAIN MEDICATION   It is common to have some mild or moderate discomfort after eye surgery.  Tylenol or ibuprofen may be taken if you don't have any other general health conditions that prevent you from taking these.      WHAT TO EXPECT  It is common for the eye to to have a blood tinged discharge for a few days after surgery  It may feel irritated (as if something were in your eye), for there to be clear discharge (thicker in the mornings upon awakening), and for it to be bloodshot for 2-3 weeks following retina surgery.   Your vision is also commonly decreased during this time due to the bubble.          WHAT TO WATCH OUT FOR  If you experience any of the following, you should call immediately:    Increasing pain    Increasing nausea or vomiting    Increasing redness    Worsening or darkening of the vision    New flashing lights or floaters      For any of the symptoms listed above, or for other concerns, call [phone] and ask to speak to the clinic nurse.  If you call after hours, follow to prompts to reach the doctor on call.                [facility]  Home Care Following Anesthesia  For 24 hours after surgery:  1. Get plenty of rest.  A responsible adult must stay with you for at least 24 hours after you leave the surgery center.  2. Do not drive or use heavy equipment.  If you have weakness or tingling, don't drive or use heavy equipment until this feeling goes away.   3. Do not drink alcohol.   4. Avoid strenuous or risky activities.  Ask for help when climbing stairs.  5. You may feel lightheaded.  IF so, sit for a few minutes  before standing.  Have someone help you get up.   6. If you have nausea (feel sick to your stomach): Drink only clear liquids such as apple juice, ginger ale, broth or 7-Up.  Rest may also help.  Be sure to drink enough fluids.  Move to a regular diet as you feel able.   7. You may have a slight fever.  Call the doctor if your fever is over 100 F (37.7 C) (taken under the tongue) or lasts longer than 24 hours.  8. You may have a dry mouth, a sore throat, muscle aches or trouble sleeping. These should go away after 24 hours.  9. Do not make important or legal decisions.     Tips for taking pain medications  To get the best pain relief possible, remember these points:    Take pain medications as directed, before pain becomes severe.    Pain medication can upset your stomach: taking it with food may help.    Constipation is a common side effect of pain medication. Drink plenty of  fluids.    Eat foods high in fiber. Take a stool softener if recommended by your doctor or pharmacist.    Do not drink alcohol, drive or operate machinery while taking pain medications.    Ask about other ways to control pain, such as with heat, ice or relaxation.    Tylenol/Acetaminophen Consumption  To help encourage the safe use of acetaminophen, the makers of TYLENOL  have lowered the maximum daily dose for single-ingredient Extra Strength TYLENOL  (acetaminophen) products sold in the [place] from 8 pills per day (4,000 mg) to 6 pills per day (3,000 mg). The dosing interval has also changed from 2 pills every 4-6 hours to 2 pills every 6 hours.    If you feel your pain relief is insufficient, you may take Tylenol/Acetaminophen in addition to your narcotic pain medication.     Be careful not to exceed 3,000 mg of Tylenol/Acetaminophen in a 24 hour period from all sources.    If you are taking extra strength Tylenol/acetaminophen (500 mg), the maximum dose is 6 tablets in 24 hours.    If you are taking regular strength acetaminophen (325 mg),  the maximum dose is 9 tablets in 24 hours.    Call a doctor for any of the followin. Signs of infection (fever, growing tenderness at the surgery site, a large amount of drainage or bleeding, severe pain, foul-smelling drainage, redness, swelling).  2. It has been over 8 to 10 hours since surgery and you are still not able to urinate (pass water).  3. Headache for over 24 hours.  4. Signs of Covid-19 infection (temperature over 100 degrees, shortness of breath, cough, loss of taste/smell, generalized body aches, persistent headache, chills, sore throat, nausea/vomiting/diarrhea)  Your doctor is:  Dr. Neelam Beavers: Opthalmology: [phone]             Or dial [phone] and ask for the resident on call for:  Ophthalmology  For emergency care, call the:  [facility]:  [phone] (TTY for hearing impaired: [phone])

## 2020-07-09 NOTE — OR NURSING
Pt upset over not getting oxycodone rx for home,  refused wheelchair ride out and left very angry. Unable to reason with pt.

## 2020-07-10 ENCOUNTER — OFFICE VISIT (OUTPATIENT)
Dept: OPHTHALMOLOGY | Facility: CLINIC | Age: 50
End: 2020-07-10
Attending: OPHTHALMOLOGY
Payer: COMMERCIAL

## 2020-07-10 DIAGNOSIS — Z98.890 POST-OPERATIVE STATE: Primary | ICD-10-CM

## 2020-07-10 PROCEDURE — G0463 HOSPITAL OUTPT CLINIC VISIT: HCPCS | Mod: ZF

## 2020-07-10 ASSESSMENT — CUP TO DISC RATIO: OS_RATIO: 0.2

## 2020-07-10 ASSESSMENT — EXTERNAL EXAM - RIGHT EYE: OD_EXAM: NORMAL

## 2020-07-10 ASSESSMENT — VISUAL ACUITY
METHOD: SNELLEN - LINEAR
OD_SC: 20/60
OD_PH_SC: 20/40
OD_PH_SC+: -1
OD_SC+: -1
OS_SC: HM

## 2020-07-10 ASSESSMENT — SLIT LAMP EXAM - LIDS
COMMENTS: NORMAL
COMMENTS: NORMAL

## 2020-07-10 ASSESSMENT — TONOMETRY
IOP_METHOD: TONOPEN
OS_IOP_MMHG: 15
OD_IOP_MMHG: 17

## 2020-07-10 ASSESSMENT — EXTERNAL EXAM - LEFT EYE: OS_EXAM: NORMAL

## 2020-07-10 NOTE — PATIENT INSTRUCTIONS
POST-OPERATIVE INSTRUCTIONS FOLLOWING RETINA SURGERY    Neelam Beavers MD, PhD  Department of Ophthalmology  [facility]  [phone]      ACTIVITY:    No heavy lifting for 2 weeks after surgery.    No swimming for 3 weeks after surgery.    It is OK for you to shower, to wash your face, and to wash your hair.  Allow the shower to hit the top of your head and wash down your face.  Do not hit your eye directly with the jet from the shower.    Keep your eye covered with the shield when you sleep for 1 week after surgery.  If your shield has a tab, it is designed to go over the bridge of your nose.  Place one piece of tape diagonally from the center of your forehead to the side of your cheek to secure the shield.     During the daytime, you can use either the shield or your regular eyeglasses or sunglasses to protect your eye.    GAS BUBBLE POSITIONING REQUIREMENTS  Positioning requirements will be discussed with you if you have an oil or gas bubble in your eye:    Position:    Upright daytime, sleep on right hand side    Maintain this positioning for 10 days.      When positioning, it is OK to take brief breaks to eat, stretch, clean up, etc.  Try to position for 90% of the time (5 minute break per hour on average).    Do not lay flat on your back until the bubble is gone.    Do not fly on an airplane or travel to elevations more than 1000 feet above [place] until the bubble is gone.    Keep the green bracelet on your wrist until the bubble is gone.  If it breaks, we can give you a replacement      EYE DROPS  Use these drops after surgery.  When using more than one drop, separate them by 3 minutes between drops.  Common times to place drops are breakfast, lunch, dinner, and bedtime.  Do not stop your drops without discussing with our office.  If you run out before your appointment, call and we will send in a refill.      Polytrim (polymyxin B / trimethoprim) --- 4 times per day  Pred Jacobe  (prednisolone acetate) --- 4 times per day    WHAT TO EXPECT  It is common for the eye to to have a blood tinged discharge for a few days after surgery  It may feel irritated (as if something were in your eye), for there to be clear discharge (thicker in the mornings upon awakening), and for it to be bloodshot for 2-3 weeks following retina surgery.   Your vision is also commonly decreased during this time due to the bubble.          WHAT TO WATCH OUT FOR  If you experience any of the following, you should call immediately:    Increasing pain    Increasing nausea or vomiting    Increasing redness    Worsening or darkening of the vision    New flashing lights or floaters      For any of the symptoms listed above, or for other concerns, call [phone] and ask to speak to the clinic nurse.  If you call after hours, follow to prompts to reach the doctor on call.

## 2020-07-10 NOTE — NURSING NOTE
Chief Complaints and History of Present Illnesses   Patient presents with     Post Op (Ophthalmology) Left Eye     Status post 25 Gauge Parsplana vitrectomy, endolaser, membrane stripping, infusion of 15% C3F8 gas 07/09/2020     Chief Complaint(s) and History of Present Illness(es)     Post Op (Ophthalmology) Left Eye     Laterality: left eye    Associated symptoms: eye pain and tearing    Pain scale: 8/10    Comments: Status post 25 Gauge Parsplana vitrectomy, endolaser, membrane stripping, infusion of 15% C3F8 gas 07/09/2020              Comments     Patient returns to clinic for a 1 day post operative visit.   He has been having some pain since surgery in his left eye.    Patch was removed this morning.  He is able to see light initially.    Mala Elise, COT 8:33 AM  July 10, 2020
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
## 2020-07-15 ENCOUNTER — NURSE TRIAGE (OUTPATIENT)
Dept: NURSING | Facility: CLINIC | Age: 50
End: 2020-07-15

## 2020-07-15 NOTE — TELEPHONE ENCOUNTER
Left message at 1510 stated able to see in clinic today if having new eye symptoms/pain    Asked to call back soon if able to come in  Wade Donnelly RN 3:11 PM 07/15/20    ---    Called and spoke to wife at [phone] and was asked to call back in 20 minutes  Wade Donnelly RN 2:48 PM 07/15/20    --      On 7/10/2020 Pt had Left eye: 25 Gauge Parsplana vitrectomy, endolaser, membrane stripping, infusion of 15% C3F8 gas      [phone]     Left message with direct number at [phone]  Wade Donnelly RN 2:37 PM 07/15/20

## 2020-07-15 NOTE — TELEPHONE ENCOUNTER
On 7/10/2020 Pt had Left eye: 25 Gauge Parsplana vitrectomy, endolaser, membrane stripping, infusion of 15% C3F8 gas     Since Sunday 7/12/2020,  Pt has been experiencing a lot of pain in the left eye.    The pain is there all the time.   Some yellowish drainage coming from the eye.    Pt is seeing black floaters in his left eye.   No fever, or chills noted.    No cold or flu symptoms noted.      No dizziness or lightheaded.     Pl call the Pt back @ [phone] for further assistance.     Thank you     Susanna Barragan RN  Central Triage Red Flags/Med Refills      Additional Information    Negative: Followed an eye injury    Negative: Eye pain from chemical in the eye    Negative: Eye pain from foreign body in eye    Negative: Has sinus pain or pressure    Negative: SEVERE eye pain    Negative: Complete loss of vision in one or both eyes    Negative: Eyelids are very swollen (shut or almost) and fever    Negative: Eyelid (outer) is very red and fever    Negative: Foreign body sensation ('feels like something is in there') and irrigation didn't help    Negative: Vomiting    Negative: Ulcer or sore seen on the cornea (clear center part of the eye)    Negative: Recent eye surgery and increasing eye pain    Negative: Blurred vision and new or worsening    Negative: Patient sounds very sick or weak to the triager    Negative: Eye pain/discomfort and more than mild    Negative: Eyelids are very swollen (shut or almost) and no fever    Negative: Painful rash near eye and multiple small blisters grouped together    Negative: Pain followed bright light exposure from welding    Negative: Looking at light causes SEVERE pain (i.e., photophobia)    Negative: Yellow or green pus occurs    Negative: Eye pain present > 24 hours    Patient wants to be seen    Protocols used: EYE PAIN-A-[place]

## 2020-07-17 ENCOUNTER — TELEPHONE (OUTPATIENT)
Dept: OPHTHALMOLOGY | Facility: CLINIC | Age: 50
End: 2020-07-17

## 2020-07-17 NOTE — TELEPHONE ENCOUNTER
Called and LM to reschedule a post op appointment due to pt not showing up for today's visit 07/17/20. Informed pt that it is important for pt to be seen as he is 2 weeks out from surgery on the LE.    ALEXANDER Guerrero 10:38 AM July 17, 2020

## 2020-12-03 ENCOUNTER — HOSPITAL ENCOUNTER (OUTPATIENT)
Facility: CLINIC | Age: 50
End: 2020-12-03
Attending: SPECIALIST | Admitting: SPECIALIST
Payer: COMMERCIAL

## 2020-12-04 DIAGNOSIS — Z11.59 ENCOUNTER FOR SCREENING FOR OTHER VIRAL DISEASES: Primary | ICD-10-CM

## 2021-04-20 ENCOUNTER — OFFICE VISIT (OUTPATIENT)
Dept: FAMILY MEDICINE | Facility: CLINIC | Age: 51
End: 2021-04-20
Payer: COMMERCIAL

## 2021-04-20 VITALS
BODY MASS INDEX: 25.48 KG/M2 | SYSTOLIC BLOOD PRESSURE: 153 MMHG | HEIGHT: 71 IN | WEIGHT: 182 LBS | OXYGEN SATURATION: 100 % | DIASTOLIC BLOOD PRESSURE: 103 MMHG | HEART RATE: 95 BPM | TEMPERATURE: 97.7 F

## 2021-04-20 DIAGNOSIS — R21 RASH AND NONSPECIFIC SKIN ERUPTION: Primary | ICD-10-CM

## 2021-04-20 DIAGNOSIS — F31.81 BIPOLAR II DISORDER (H): ICD-10-CM

## 2021-04-20 DIAGNOSIS — F17.200 SMOKER: ICD-10-CM

## 2021-04-20 DIAGNOSIS — R03.0 ELEVATED BLOOD PRESSURE READING WITHOUT DIAGNOSIS OF HYPERTENSION: ICD-10-CM

## 2021-04-20 DIAGNOSIS — Z12.5 SCREENING FOR PROSTATE CANCER: ICD-10-CM

## 2021-04-20 DIAGNOSIS — Z12.11 COLON CANCER SCREENING: ICD-10-CM

## 2021-04-20 DIAGNOSIS — F14.21 HISTORY OF COCAINE DEPENDENCE (H): ICD-10-CM

## 2021-04-20 PROCEDURE — 99204 OFFICE O/P NEW MOD 45 MIN: CPT | Performed by: INTERNAL MEDICINE

## 2021-04-20 RX ORDER — TRIAMCINOLONE ACETONIDE 1 MG/G
CREAM TOPICAL 2 TIMES DAILY
Qty: 30 G | Refills: 0 | Status: SHIPPED | OUTPATIENT
Start: 2021-04-20 | End: 2021-04-30

## 2021-04-20 SDOH — HEALTH STABILITY: MENTAL HEALTH: HOW OFTEN DO YOU HAVE A DRINK CONTAINING ALCOHOL?: NOT ASKED

## 2021-04-20 SDOH — HEALTH STABILITY: MENTAL HEALTH: HOW MANY STANDARD DRINKS CONTAINING ALCOHOL DO YOU HAVE ON A TYPICAL DAY?: NOT ASKED

## 2021-04-20 SDOH — HEALTH STABILITY: MENTAL HEALTH: HOW OFTEN DO YOU HAVE 6 OR MORE DRINKS ON ONE OCCASION?: NOT ASKED

## 2021-04-20 ASSESSMENT — ANXIETY QUESTIONNAIRES
6. BECOMING EASILY ANNOYED OR IRRITABLE: MORE THAN HALF THE DAYS
5. BEING SO RESTLESS THAT IT IS HARD TO SIT STILL: NEARLY EVERY DAY
2. NOT BEING ABLE TO STOP OR CONTROL WORRYING: SEVERAL DAYS
3. WORRYING TOO MUCH ABOUT DIFFERENT THINGS: SEVERAL DAYS
GAD7 TOTAL SCORE: 11
IF YOU CHECKED OFF ANY PROBLEMS ON THIS QUESTIONNAIRE, HOW DIFFICULT HAVE THESE PROBLEMS MADE IT FOR YOU TO DO YOUR WORK, TAKE CARE OF THINGS AT HOME, OR GET ALONG WITH OTHER PEOPLE: VERY DIFFICULT
1. FEELING NERVOUS, ANXIOUS, OR ON EDGE: MORE THAN HALF THE DAYS
7. FEELING AFRAID AS IF SOMETHING AWFUL MIGHT HAPPEN: NOT AT ALL

## 2021-04-20 ASSESSMENT — PATIENT HEALTH QUESTIONNAIRE - PHQ9
5. POOR APPETITE OR OVEREATING: MORE THAN HALF THE DAYS
SUM OF ALL RESPONSES TO PHQ QUESTIONS 1-9: 12

## 2021-04-20 ASSESSMENT — MIFFLIN-ST. JEOR: SCORE: 1697.68

## 2021-04-20 NOTE — PROGRESS NOTES
Assessment & Plan     Rash and nonspecific skin eruption  Trial topical steroid BID to affected area x 7 days. If not improving, refer to derm for next steps.   - triamcinolone (KENALOG) 0.1 % external cream  Dispense: 30 g; Refill: 0    Bipolar II disorder (H)  Pulled from problem list. Patient currently exhibiting mild agitation. No aggressive behavior.  PHQ: 12  ARIELLE: 11  No si/hi.  Currently off medications. After discussion, he is open to referral. Recommend re-establish with [facility], another option is [facility]. Information provided.   - MENTAL HEALTH REFERRAL  - Adult; Psychiatry; Psychiatry; Other: [facility] [phone]; Patient call to schedule    History of cocaine dependence (H)  Smoker  He denies current illicit drug use. He is a smoker, however and was encouraged to see me when ready to quit. Discussed risks of smoking.  He exhibits some mild agitation today. His mood is appropriate. He presents with intermittent lid lag and appears off balance-denies alcohol use and again denies illicit drug use. I discussed doing urine drug screen today with him given his hx and current presentation, he relays he is unable to provide urine as he just went. Relayed will revisit this at next visit.    Elevated blood pressure reading without diagnosis of hypertension  He denies hx of this. Recommend home BP monitoring. His partner he is here with today states she has a cuff and will assist with monitoring. I discussed cscope should not be done if BP high at home, treatment would be recommended and should return to see me prior to cscope.  - CBC with platelets differential  - Comprehensive metabolic panel  - Hemoglobin A1c  - TSH with free T4 reflex    Screening for prostate cancer  - Prostate spec antigen screen    Colon cancer screening  Routine cscope due.  See above regarding recommendations for monitoring BP prior to proceed with cscope due to risks. Patient and his partner  agreeable to plan.  - GASTROENTEROLOGY ADULT REF PROCEDURE ONLY    The author of this note documented a reason for not sharing it with the patient.    Return in about 4 weeks (around 5/18/2021) for Follow up, with me, in person, sooner if symptoms worsen or do not improve.    DO DEMAR Duran [facility] CAMILA Cintron is a 50 year old who presents for the following health issues     HPI     Former PCP: none  Specialists: none  Problem list and medications reviewed and updated. See below for additional notes.  Social: smoker    Patient here to establish care. He presents with his partner.   He reports rash to R arm and trunk x 2-3 weeks. Itchy. No new products or clear triggers. Denies f/c. Has not tried anything for relief.  He reports he used to be on psychiatric medications, not anymore. States lots of stress/anxiety right now due to cousins death in the last day or two. He does not have current psychiatrist. Does not remember who he used to see. Denies si/hi.   He would like referral for cscope. He would like test for prostate. Has not had cscope for over 10 years but does recall one around then. Denies family hx colon cancer. States thinks dad might have had prostate cancer.  Denies drug use such as marijuana or cocaine. Denies alcohol use. Smokes cigarettes.  Denies hx HTN or being on antihypertensives in the past    Last PHQ-9 4/20/2021   1.  Little interest or pleasure in doing things 2   2.  Feeling down, depressed, or hopeless 2   3.  Trouble falling or staying asleep, or sleeping too much 2   4.  Feeling tired or having little energy 2   5.  Poor appetite or overeating 1   6.  Feeling bad about yourself 1   7.  Trouble concentrating 2   8.  Moving slowly or restless 0   Q9: Thoughts of better off dead/self-harm past 2 weeks 0   PHQ-9 Total Score 12   Difficulty at work, home, or with people Very difficult   ARIELLE-7  4/20/2021   1. Feeling nervous, anxious, or on edge 2   2. Not  "being able to stop or control worrying 1   3. Worrying too much about different things 1   4. Trouble relaxing 2   5. Being so restless that it is hard to sit still 3   6. Becoming easily annoyed or irritable 2   7. Feeling afraid, as if something awful might happen 0   ARIELEL-7 Total Score 11   If you checked any problems, how difficult have they made it for you to do your work, take care of things at home, or get along with other people? Very difficult         Review of Systems   Constitutional, HEENT, cardiovascular, pulmonary, gi and gu systems are negative, except as otherwise noted.      Objective    BP (!) 153/103 (BP Location: Left arm, Cuff Size: Adult Regular)   Pulse 95   Temp 97.7  F (36.5  C) (Temporal)   Ht 1.803 m (5' 11\")   Wt 82.6 kg (182 lb)   SpO2 100%   BMI 25.38 kg/m    Body mass index is 25.38 kg/m .  Physical Exam     GENERAL APPEARANCE: No distress   SKIN: hyperpigemented pruritic rash to R antecubital fossa and L trunk, no erythema or vesicles. Slightly raised.   PSYCH: mood and affect appropriate; mildly agitated/anxious   NEURO : gait impaired at times, not consistent              "

## 2021-04-21 DIAGNOSIS — N52.9 IMPOTENCE OF ORGANIC ORIGIN: ICD-10-CM

## 2021-04-21 DIAGNOSIS — R03.0 ELEVATED BLOOD PRESSURE READING WITHOUT DIAGNOSIS OF HYPERTENSION: ICD-10-CM

## 2021-04-21 DIAGNOSIS — K21.9 GASTROESOPHAGEAL REFLUX DISEASE WITHOUT ESOPHAGITIS: Primary | ICD-10-CM

## 2021-04-21 DIAGNOSIS — F31.81 BIPOLAR II DISORDER (H): ICD-10-CM

## 2021-04-21 ASSESSMENT — ANXIETY QUESTIONNAIRES: GAD7 TOTAL SCORE: 11

## 2021-05-13 ENCOUNTER — HOSPITAL ENCOUNTER (OUTPATIENT)
Facility: CLINIC | Age: 51
End: 2021-05-13
Attending: SPECIALIST | Admitting: SPECIALIST
Payer: COMMERCIAL

## 2021-06-04 VITALS
WEIGHT: 186.5 LBS | DIASTOLIC BLOOD PRESSURE: 88 MMHG | RESPIRATION RATE: 16 BRPM | OXYGEN SATURATION: 98 % | BODY MASS INDEX: 26.01 KG/M2 | SYSTOLIC BLOOD PRESSURE: 124 MMHG | HEART RATE: 88 BPM | TEMPERATURE: 98 F

## 2021-06-08 NOTE — PROGRESS NOTES
Chief Complaint   Patient presents with     Follow-up     Pt c/o slipping out of the shower and tore your meniscus, pt c/o its hard to get comfortable to sleep       HPI: 49-year-old male who is complicated, and has a past history of schizophrenia, hallucinations, chronic low back pain, insomnia and psychosis, presents after being seen at the emergency department at [facility] 4 days ago for knee pain.  He has left knee pain that he attributes to falling in the shower proximally 1 month ago.  X-rays were performed and I reviewed completely the medical records from 6/6/2020.  No evidence of fracture displacement were noted.  Concern for meniscal tear was noted.  The patient notes continued discomfort and has not been given an orthopedic appointment to his knowledge.    ROS: 10 point system review complete notable for psychosis, schizophrenia, bipolar disorder, chronic low back pain.  Insomnia.    SH: Former smoker and consumes alcohol     FH: Notable for cancer including pancreatic cancer    Meds:  Abdulaziz has a current medication list which includes the following prescription(s): ibuprofen.    O:  /88   Pulse 88   Temp 98  F (36.7  C)   Resp 16   Wt 186 lb 8 oz (84.6 kg)   SpO2 98%   Constitutional:    --Vitals as above  --No acute distress  Eyes-  --Sclera noninjected  --Lids and conjunctiva normal  Musculoskeletal-  -- Examination of the left knee shows a soft wrap in place.  No substantial swelling noted.  Pain to flexion extension.  Pain to medial palpation of the joint space.  Neck-  --Neck supple    Lymph-  --No cervical lymphadenopathy  Lungs-  --Clear to Auscultation  Heart-  --Regular rate and rhythm  Skin-  --Pink and dry    A/P:   1. Acute pain of left knee  The patient has acute left knee pain.  Given his large number of psychiatric medications it would be improper to give him narcotics.  Will treat with Naprosyn 500 twice daily to be taken with food.  I also put a referral in  for orthopedic evaluation.  I also recommended nonweightbearing.  - Ambulatory referral to Orthopedics

## 2021-11-15 ENCOUNTER — TELEPHONE (OUTPATIENT)
Dept: OPHTHALMOLOGY | Facility: CLINIC | Age: 51
End: 2021-11-15
Payer: COMMERCIAL

## 2021-11-15 NOTE — TELEPHONE ENCOUNTER
[facility] Call Center    Phone Message    May a detailed message be left on voicemail: yes     Reason for Call: Symptoms or Concerns     If patient has red-flag symptoms, warm transfer to triage line    Current symptom or concern: Lt eye blurriness with glasses, worse without (with a grayish color).    Symptoms have been present for:  2 month(s)    Has patient previously been seen for this? Yes    By : Dr. Beavers    Date: 7/10/2020    Are there any new or worsening symptoms? Yes: Pt states he noticed the blurriness about 2 months ago and it seems to be worsening.      Action Taken: Message routed to:  [facility] EYE    Travel Screening: Not Applicable

## 2021-11-15 NOTE — TELEPHONE ENCOUNTER
Called and LVM for Abdulaziz to call me directly in regards to making an appt with Dr. De León.    Lisa Aguirre Communication Facilitator on 11/15/2021 at 1:54 PM

## 2021-11-22 NOTE — TELEPHONE ENCOUNTER
Patient called and stated he hasn't got called back. Informed him clinic did LVM last week. He hasn't checked his messages yet.    He can be reached back at [phone]. If he doesn't answer I did inform him clinic would leave message and to make sure he checks them.

## 2022-01-03 DIAGNOSIS — H33.22 RETINAL DETACHMENT, LEFT: Primary | ICD-10-CM

## 2022-02-15 ENCOUNTER — TELEPHONE (OUTPATIENT)
Dept: OPHTHALMOLOGY | Facility: CLINIC | Age: 52
End: 2022-02-15
Payer: COMMERCIAL

## 2022-02-15 NOTE — TELEPHONE ENCOUNTER
Spoke with patient re no shows.  Patient is aware that if he no shows to next appointment that we may ask him to seek care elsewhere.    Scheduled with Dr. Beavers for march 1st.  Yury Porras

## 2022-02-23 DIAGNOSIS — H33.22 RETINAL DETACHMENT, LEFT: Primary | ICD-10-CM

## 2022-03-15 ENCOUNTER — OFFICE VISIT (OUTPATIENT)
Dept: OPHTHALMOLOGY | Facility: CLINIC | Age: 52
End: 2022-03-15
Attending: OPHTHALMOLOGY
Payer: COMMERCIAL

## 2022-03-15 ENCOUNTER — TELEPHONE (OUTPATIENT)
Dept: OPHTHALMOLOGY | Facility: CLINIC | Age: 52
End: 2022-03-15

## 2022-03-15 DIAGNOSIS — Z86.69 HISTORY OF RETINAL DETACHMENT: ICD-10-CM

## 2022-03-15 DIAGNOSIS — H43.391 VITREOUS SYNERESIS OF RIGHT EYE: Primary | ICD-10-CM

## 2022-03-15 DIAGNOSIS — H33.322 RETINAL ROUND HOLE WITHOUT DETACHMENT, LEFT: ICD-10-CM

## 2022-03-15 PROCEDURE — G0463 HOSPITAL OUTPT CLINIC VISIT: HCPCS | Mod: 25

## 2022-03-15 PROCEDURE — 92134 CPTRZ OPH DX IMG PST SGM RTA: CPT | Performed by: OPHTHALMOLOGY

## 2022-03-15 PROCEDURE — 99214 OFFICE O/P EST MOD 30 MIN: CPT | Performed by: OPHTHALMOLOGY

## 2022-03-15 PROCEDURE — 92250 FUNDUS PHOTOGRAPHY W/I&R: CPT | Performed by: OPHTHALMOLOGY

## 2022-03-15 ASSESSMENT — REFRACTION_WEARINGRX
OS_SPHERE: -6.50
OS_CYLINDER: +1.75
OD_AXIS: 093
OD_CYLINDER: +1.50
OS_AXIS: 081
OD_SPHERE: -4.50

## 2022-03-15 ASSESSMENT — EXTERNAL EXAM - LEFT EYE: OS_EXAM: NORMAL

## 2022-03-15 ASSESSMENT — REFRACTION_MANIFEST
OD_AXIS: 085
OD_SPHERE: -4.75
OD_ADD: +2.00
OD_CYLINDER: +1.00

## 2022-03-15 ASSESSMENT — CUP TO DISC RATIO
OS_RATIO: 0.4
OD_RATIO: 0.3

## 2022-03-15 ASSESSMENT — EXTERNAL EXAM - RIGHT EYE: OD_EXAM: NORMAL

## 2022-03-15 ASSESSMENT — VISUAL ACUITY
OD_SC: 20/150
OS_PH_SC: 20/300
OS_SC: CF @ 3'
METHOD: SNELLEN - LINEAR

## 2022-03-15 ASSESSMENT — TONOMETRY
OD_IOP_MMHG: 12
IOP_METHOD: TONOPEN
OS_IOP_MMHG: 12

## 2022-03-15 ASSESSMENT — SLIT LAMP EXAM - LIDS
COMMENTS: NORMAL
COMMENTS: NORMAL

## 2022-03-15 ASSESSMENT — CONF VISUAL FIELD
OS_SUPERIOR_TEMPORAL_RESTRICTION: 3
OS_INFERIOR_NASAL_RESTRICTION: 3
OS_INFERIOR_TEMPORAL_RESTRICTION: 3
OS_SUPERIOR_NASAL_RESTRICTION: 3
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
## 2022-03-15 NOTE — TELEPHONE ENCOUNTER
Pt had asked that we contact his spouse, Rosalba, to make the appointments suggested by Dr. Beavers today.    Rosalba did not answer, but I tentatively made an appt with Dr. Sykes for the end of the month for the cataract eval and left this info on the VM along with my direct callback to schedule the other visit back with Dr. Beavers (laser treatment).    JAD Holder 4:13 PM March 15, 2022   
none

## 2022-03-31 DIAGNOSIS — H43.391 VITREOUS SYNERESIS OF RIGHT EYE: Primary | ICD-10-CM

## 2022-03-31 DIAGNOSIS — H33.22 RETINAL DETACHMENT, LEFT: ICD-10-CM

## 2022-04-05 ENCOUNTER — TELEPHONE (OUTPATIENT)
Dept: OPHTHALMOLOGY | Facility: CLINIC | Age: 52
End: 2022-04-05
Payer: COMMERCIAL

## 2022-04-05 NOTE — TELEPHONE ENCOUNTER
Called and spoke to Abdulaziz Cintron missed his appointment for 4/5 with dr. Gamble and 3/31 with Dr. Sykes.     He wanted to reschedule with Dr. Gamble for 5/13 @ 920 am     Yury / Izzy Aguirre Communication Facilitator on 4/5/2022 at 12:47 PM

## 2022-04-05 NOTE — TELEPHONE ENCOUNTER
Health Call Center    Phone Message    May a detailed message be left on voicemail: yes     Reason for Call: Other:   Pt called to attempt to cancel today's appt with Nimesh but was under the impression that he was coming in for a cataract surgery. Writer notified pt that he missed his cataract consult with Onel on 03/31 and today's appt is a follow-up with his retina doctor. Writer tried to explain but Pt request a call back from the nurse to clarify and then pt disconnected. Please follow-up.     Action Taken: Other:  eye    Travel Screening: Not Applicable

## 2022-05-13 ENCOUNTER — TELEPHONE (OUTPATIENT)
Dept: OPHTHALMOLOGY | Facility: CLINIC | Age: 52
End: 2022-05-13
Payer: COMMERCIAL

## 2022-05-16 ENCOUNTER — TELEPHONE (OUTPATIENT)
Dept: OPHTHALMOLOGY | Facility: CLINIC | Age: 52
End: 2022-05-16
Payer: COMMERCIAL

## 2022-05-16 NOTE — TELEPHONE ENCOUNTER
[facility] Call Center    Phone Message    May a detailed message be left on voicemail: yes     Reason for Call: Appointment Intake    Referring Provider Name: Dr. Beavers   Diagnosis and/or Symptoms: Laser with Dr. Beavers was canceled on 5/13 due to laser being serviced. Please call back to reschedule     Action Taken: Message routed to:  [facility] eye    Travel Screening: Not Applicable

## 2022-05-16 NOTE — TELEPHONE ENCOUNTER
Called and spoke to kitty     Made him an appointment for 5/17 @ 840 am with Dr. Gamble     Provided the clinic address     Lisa Aguirre Communication Facilitator on 5/16/2022 at 9:54 AM

## 2022-05-17 ENCOUNTER — OFFICE VISIT (OUTPATIENT)
Dept: OPHTHALMOLOGY | Facility: CLINIC | Age: 52
End: 2022-05-17
Payer: COMMERCIAL

## 2022-05-17 DIAGNOSIS — Z86.69 HISTORY OF RETINAL DETACHMENT: Primary | ICD-10-CM

## 2022-05-17 DIAGNOSIS — H33.22 RETINAL DETACHMENT, LEFT: ICD-10-CM

## 2022-05-17 PROCEDURE — 99213 OFFICE O/P EST LOW 20 MIN: CPT | Performed by: OPHTHALMOLOGY

## 2022-05-17 PROCEDURE — 92250 FUNDUS PHOTOGRAPHY W/I&R: CPT | Performed by: OPHTHALMOLOGY

## 2022-05-17 PROCEDURE — G0463 HOSPITAL OUTPT CLINIC VISIT: HCPCS | Mod: 25

## 2022-05-17 RX ORDER — LORAZEPAM 0.5 MG/1
0.5 TABLET ORAL EVERY 4 HOURS PRN
Status: ACTIVE | OUTPATIENT
Start: 2022-05-17

## 2022-05-17 ASSESSMENT — REFRACTION_WEARINGRX
OD_CYLINDER: +1.50
OD_SPHERE: -4.50
OS_SPHERE: -4.75
OS_SPHERE: -6.50
OS_CYLINDER: +1.75
OD_AXIS: 093
OS_AXIS: 081
OS_CYLINDER: +1.00
OS_AXIS: 085

## 2022-05-17 ASSESSMENT — VISUAL ACUITY
OS_PH_SC: 20/300
OD_SC: 20/250
METHOD: SNELLEN - LINEAR
OS_SC: CF @ 5'
OS_SC: 20/300
METHOD: SNELLEN - LINEAR
CORRECTION_TYPE: GLASSES
OD_PH_SC: 20/25
OD_PH_SC+: +3

## 2022-05-17 ASSESSMENT — TONOMETRY
IOP_METHOD: ICARE
OD_IOP_MMHG: 9
OS_IOP_MMHG: 10

## 2022-05-17 ASSESSMENT — REFRACTION_MANIFEST
OD_ADD: +2.00
OD_CYLINDER: +1.00
OS_ADD: +2.00
OS_SPHERE: BALANCE
OD_AXIS: 085
OD_SPHERE: -4.75

## 2022-05-17 ASSESSMENT — CONF VISUAL FIELD
OD_NORMAL: 1
OS_NORMAL: 1
METHOD: COUNTING FINGERS

## 2022-05-17 ASSESSMENT — CUP TO DISC RATIO
OS_RATIO: 0.4
OD_RATIO: 0.3

## 2022-05-17 ASSESSMENT — SLIT LAMP EXAM - LIDS
COMMENTS: NORMAL
COMMENTS: NORMAL

## 2022-05-17 ASSESSMENT — EXTERNAL EXAM - RIGHT EYE: OD_EXAM: NORMAL

## 2022-05-17 ASSESSMENT — EXTERNAL EXAM - LEFT EYE: OS_EXAM: NORMAL

## 2022-05-17 NOTE — NURSING NOTE
Chief Complaints and History of Present Illnesses   Patient presents with     Follow Up     Vitreous syneresis of right eye      Chief Complaint(s) and History of Present Illness(es)     Follow Up     Laterality: both eyes    Onset: gradual    Onset: years ago    Course: stable    Associated symptoms: dryness, eye pain (LE), tearing and floaters.  Negative for flashes    Treatments tried: no treatments    Pain scale: 8/10    Comments: Vitreous syneresis of right eye               Comments     Pt states vision is stable since last visit.  No change to floaters.  Hard to see at night.  No ocular medications.    Nancy Zamorano, COT May 17, 2022 8:27 AM
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
